# Patient Record
Sex: FEMALE | Race: WHITE | URBAN - METROPOLITAN AREA
[De-identification: names, ages, dates, MRNs, and addresses within clinical notes are randomized per-mention and may not be internally consistent; named-entity substitution may affect disease eponyms.]

---

## 2018-06-11 ENCOUNTER — OFFICE VISIT (OUTPATIENT)
Dept: DERMATOLOGY | Facility: CLINIC | Age: 63
End: 2018-06-11
Payer: COMMERCIAL

## 2018-06-11 ENCOUNTER — TELEPHONE (OUTPATIENT)
Dept: DERMATOLOGY | Facility: CLINIC | Age: 63
End: 2018-06-11

## 2018-06-11 VITALS — OXYGEN SATURATION: 99 % | HEART RATE: 72 BPM

## 2018-06-11 DIAGNOSIS — L82.1 SEBORRHEIC KERATOSIS: ICD-10-CM

## 2018-06-11 DIAGNOSIS — L81.4 LENTIGO: ICD-10-CM

## 2018-06-11 DIAGNOSIS — L82.0 INFLAMED SEBORRHEIC KERATOSIS: ICD-10-CM

## 2018-06-11 DIAGNOSIS — D18.00 ANGIOMA: ICD-10-CM

## 2018-06-11 DIAGNOSIS — C44.612: ICD-10-CM

## 2018-06-11 DIAGNOSIS — D18.00 ANGIOMA: Primary | ICD-10-CM

## 2018-06-11 DIAGNOSIS — D48.5 NEOPLASM OF UNCERTAIN BEHAVIOR OF SKIN: Primary | ICD-10-CM

## 2018-06-11 DIAGNOSIS — D22.9 NEVUS: ICD-10-CM

## 2018-06-11 PROCEDURE — 11101 ZZHC BIOPSY SKIN/SUBQ/MUC MEM, EACH ADDTL LESION: CPT | Performed by: PHYSICIAN ASSISTANT

## 2018-06-11 PROCEDURE — 11100 HC BIOPSY SKIN/SUBQ/MUC MEM, SINGLE LESION: CPT | Mod: 59 | Performed by: PHYSICIAN ASSISTANT

## 2018-06-11 PROCEDURE — 88331 PATH CONSLTJ SURG 1 BLK 1SPC: CPT | Performed by: DERMATOLOGY

## 2018-06-11 PROCEDURE — 17110 DESTRUCTION B9 LES UP TO 14: CPT | Mod: 51 | Performed by: PHYSICIAN ASSISTANT

## 2018-06-11 PROCEDURE — 99203 OFFICE O/P NEW LOW 30 MIN: CPT | Mod: 25 | Performed by: PHYSICIAN ASSISTANT

## 2018-06-11 RX ORDER — SIMVASTATIN 20 MG
40 TABLET ORAL
COMMUNITY
Start: 2018-05-02

## 2018-06-11 RX ORDER — SENNOSIDES A AND B 8.6 MG/1
8.6 TABLET, FILM COATED ORAL
COMMUNITY
Start: 2017-07-12

## 2018-06-11 RX ORDER — INSULIN PUMP SYRINGE, 3 ML
EACH MISCELLANEOUS
COMMUNITY
Start: 2017-05-03

## 2018-06-11 RX ORDER — VALACYCLOVIR HYDROCHLORIDE 500 MG/1
500 TABLET, FILM COATED ORAL
COMMUNITY
Start: 2017-07-12

## 2018-06-11 RX ORDER — BLOOD SUGAR DIAGNOSTIC
STRIP MISCELLANEOUS
COMMUNITY
Start: 2018-05-08

## 2018-06-11 NOTE — MR AVS SNAPSHOT
After Visit Summary   6/11/2018    Anna Marie Lindquist    MRN: [id number]           Patient Information     Date Of Birth          1955        Visit Information        Provider Department      6/11/2018 12:45 PM Mariah Melo PA-C [facility]        Care Instructions          Wound Care Instructions     FOR SUPERFICIAL WOUNDS     [facility] [phone]    [facility] [phone]                       AFTER 24 HOURS YOU SHOULD REMOVE THE BANDAGE AND BEGIN DAILY DRESSING CHANGES AS FOLLOWS:     1) Remove Dressing.     2) Clean and dry the area with tap water using a Q-tip or sterile gauze pad.     3) Apply Vaseline, Aquaphor, Polysporin ointment or Bacitracin ointment over entire wound.  Do NOT use Neosporin ointment.     4) Cover the wound with a band-aid, or a sterile non-stick gauze pad and micropore paper tape      REPEAT THESE INSTRUCTIONS AT LEAST ONCE A DAY UNTIL THE WOUND HAS COMPLETELY HEALED.    It is an old wives tale that a wound heals better when it is exposed to air and allowed to dry out. The wound will heal faster with a better cosmetic result if it is kept moist with ointment and covered with a bandage.    **Do not let the wound dry out.**      Supplies Needed:      *Cotton tipped applicators (Q-tips)    *Polysporin Ointment or Bacitracin Ointment (NOT NEOSPORIN)    *Band-aids or non-stick gauze pads and micropore paper tape.      PATIENT INFORMATION:    During the healing process you will notice a number of changes. All wounds develop a small halo of redness surrounding the wound.  This means healing is occurring. Severe itching with extensive redness usually indicates sensitivity to the ointment or bandage tape used to dress the wound.  You should call our office if this develops.      Swelling  and/or discoloration around your surgical site is common, particularly when performed around the eye.    All wounds normally drain.  The larger  the wound the more drainage there will be.  After 7-10 days, you will notice the wound beginning to shrink and new skin will begin to grow.  The wound is healed when you can see skin has formed over the entire area.  A healed wound has a healthy, shiny look to the surface and is red to dark pink in color to normalize.  Wounds may take approximately 4-6 weeks to heal.  Larger wounds may take 6-8 weeks.  After the wound is healed you may discontinue dressing changes.    You may experience a sensation of tightness as your wound heals. This is normal and will gradually subside.    Your healed wound may be sensitive to temperature changes. This sensitivity improves with time, but if you re having a lot of discomfort, try to avoid temperature extremes.    Patients frequently experience itching after their wound appears to have healed because of the continue healing under the skin.  Plain Vaseline will help relieve the itching.        POSSIBLE COMPLICATIONS    BLEEDIN. Leave the bandage in place.  2. Use tightly rolled up gauze or a cloth to apply direct pressure over the bandage for 30  minutes.  3. Reapply pressure for an additional 30 minutes if necessary  4. Use additional gauze and tape to maintain pressure once the bleeding has stopped.    Proper skin care from Dr. Vegas- [facility]     Eliminate harsh soaps, i.e. Dial, Zest, [place] Spring;   Use mild soaps such as Cetaphil or Dove Sensitive Skin   Avoid hot or cold showers   After showering, lightly dry off.    Aggressive use of a moisturizer (including Vanicream, Cetaphil, Aquaphor or Cerave)   We recommend using a tub that needs to be scooped out, not a pump. This has more of an oil base. It will hold moisture in your skin much better than a water base moisturizer. The ones recommended are non- pore clogging.       If you have any questions call [phone] and follow the prompts to Dr. Vegas's office.             Follow-ups after your visit         Who to contact     If you have questions or need follow up information about today's clinic visit or your schedule please contact [facility] directly at [phone].  Normal or non-critical lab and imaging results will be communicated to you by MyChart, letter or phone within 4 business days after the clinic has received the results. If you do not hear from us within 7 days, please contact the clinic through MyChart or phone. If you have a critical or abnormal lab result, we will notify you by phone as soon as possible.  Submit refill requests through [employer] or call your pharmacy and they will forward the refill request to us. Please allow 3 business days for your refill to be completed.          Additional Information About Your Visit        Care EveryWhere ID     This is your Care EveryWhere ID. This could be used by other organizations to access your [place] medical records  [id number]        Your Vitals Were     Pulse Last Period Pulse Oximetry             72 08/21/2002 99%          Blood Pressure from Last 3 Encounters:   09/27/06 110/70   09/13/06 121/75   04/28/05 104/64    Weight from Last 3 Encounters:   09/27/06 84.4 kg (186 lb)   09/13/06 81.9 kg (180 lb 8 oz)   04/28/05 86.2 kg (190 lb)              Today, you had the following     No orders found for display       Primary Care Provider Office Phone # Fax #    Olamide SHERMAN MD [facility] [phone]       [address]  [facility] [address]        Equal Access to Services     NADINE SAMAYOA : Hadii heike haines hadasho Sojo[place]inali, waaxda luqadaha, qaybta kaalmada yunior, eduardo patel . So [facility] [phone].    ATENCIÓN: Si abdiasla roslyn, tiene a ayala disposición servicios gratuitos de asistencia lingüística. Larry al [phone].    We comply with applicable federal civil rights laws and [place] laws. We do not discriminate on the basis of race, color, national origin, age, disability, sex,  sexual orientation, or gender identity.            Thank you!     Thank you for choosing [facility]  for your care. Our goal is always to provide you with excellent care. Hearing back from our patients is one way we can continue to improve our services. Please take a few minutes to complete the written survey that you may receive in the mail after your visit with us. Thank you!             Your Updated Medication List - Protect others around you: Learn how to safely use, store and throw away your medicines at www.disposemymeds.org.          This list is accurate as of 6/11/18  1:19 PM.  Always use your most recent med list.                   Brand Name Dispense Instructions for use Diagnosis    ACCU-CHEK GUIDE test strip   Generic drug:  blood glucose monitoring           FIFTY50 GLUCOSE METER 2.0 w/Device Kit      Dispense glucose meter, test strips and lancets covered by the patient insurance. Test 1 time per day.        metFORMIN 500 MG tablet    GLUCOPHAGE     Take 500 mg by mouth        ONE-A-DAY WOMENS 50+ ADVANTAGE Tabs           senna 8.6 MG tablet    SENOKOT     Take 8.6 mg by mouth        sertraline 50 MG tablet    ZOLOFT     Take 50 mg by mouth        simvastatin 20 MG tablet    ZOCOR     Take 40 mg by mouth        valACYclovir 500 MG tablet    VALTREX     Take 500 mg by mouth        vitamin D3 1000 units Caps      Take 2,000 Units by mouth

## 2018-06-11 NOTE — NURSING NOTE
"Initial Pulse 72  LMP 08/21/2002  SpO2 99% Estimated body mass index is 28.7 kg/(m^2) as calculated from the following:    Height as of 9/13/06: 1.715 m (5' 7.5\").    Weight as of 9/27/06: 84.4 kg (186 lb). .      "

## 2018-06-11 NOTE — TELEPHONE ENCOUNTER
----- Message from Jhonny Vegas MD sent at 6/11/2018  1:41 PM CDT -----  L forearm angioma no treatment   R deltoid basal cell carcinoma schedule

## 2018-06-11 NOTE — PATIENT INSTRUCTIONS
Wound Care Instructions     FOR SUPERFICIAL WOUNDS     [facility] [phone]    [facility] [phone]                       AFTER 24 HOURS YOU SHOULD REMOVE THE BANDAGE AND BEGIN DAILY DRESSING CHANGES AS FOLLOWS:     1) Remove Dressing.     2) Clean and dry the area with tap water using a Q-tip or sterile gauze pad.     3) Apply Vaseline, Aquaphor, Polysporin ointment or Bacitracin ointment over entire wound.  Do NOT use Neosporin ointment.     4) Cover the wound with a band-aid, or a sterile non-stick gauze pad and micropore paper tape      REPEAT THESE INSTRUCTIONS AT LEAST ONCE A DAY UNTIL THE WOUND HAS COMPLETELY HEALED.    It is an old wives tale that a wound heals better when it is exposed to air and allowed to dry out. The wound will heal faster with a better cosmetic result if it is kept moist with ointment and covered with a bandage.    **Do not let the wound dry out.**      Supplies Needed:      *Cotton tipped applicators (Q-tips)    *Polysporin Ointment or Bacitracin Ointment (NOT NEOSPORIN)    *Band-aids or non-stick gauze pads and micropore paper tape.      PATIENT INFORMATION:    During the healing process you will notice a number of changes. All wounds develop a small halo of redness surrounding the wound.  This means healing is occurring. Severe itching with extensive redness usually indicates sensitivity to the ointment or bandage tape used to dress the wound.  You should call our office if this develops.      Swelling  and/or discoloration around your surgical site is common, particularly when performed around the eye.    All wounds normally drain.  The larger the wound the more drainage there will be.  After 7-10 days, you will notice the wound beginning to shrink and new skin will begin to grow.  The wound is healed when you can see skin has formed over the entire area.  A healed wound has a healthy, shiny look to the surface and is red to dark pink in color  to normalize.  Wounds may take approximately 4-6 weeks to heal.  Larger wounds may take 6-8 weeks.  After the wound is healed you may discontinue dressing changes.    You may experience a sensation of tightness as your wound heals. This is normal and will gradually subside.    Your healed wound may be sensitive to temperature changes. This sensitivity improves with time, but if you re having a lot of discomfort, try to avoid temperature extremes.    Patients frequently experience itching after their wound appears to have healed because of the continue healing under the skin.  Plain Vaseline will help relieve the itching.        POSSIBLE COMPLICATIONS    BLEEDIN. Leave the bandage in place.  2. Use tightly rolled up gauze or a cloth to apply direct pressure over the bandage for 30  minutes.  3. Reapply pressure for an additional 30 minutes if necessary  4. Use additional gauze and tape to maintain pressure once the bleeding has stopped.    Proper skin care from Dr. Vegas- [facility]     Eliminate harsh soaps, i.e. Dial, Zest, [place];   Use mild soaps such as Cetaphil or Dove Sensitive Skin   Avoid hot or cold showers   After showering, lightly dry off.    Aggressive use of a moisturizer (including Vanicream, Cetaphil, Aquaphor or Cerave)   We recommend using a tub that needs to be scooped out, not a pump. This has more of an oil base. It will hold moisture in your skin much better than a water base moisturizer. The ones recommended are non- pore clogging.       If you have any questions call [phone] and follow the prompts to Dr. Vegas's office.

## 2018-06-11 NOTE — PROGRESS NOTES
HPI:   Anna Marie Lindquist is a 62 year old female who presents for Full skin cancer screening.  chief complaint  Last Skin Exam: years ago      1st Baseline: no  Personal HX of Skin Cancer: no   Personal HX of Malignant Melanoma: no   Family HX of Skin Cancer / Malignant Melanoma: no  Personal HX of Atypical Moles:   no  Risk factors: sun exposure; history of blistering sunburns  New / Changing lesions:yes few scaly areas  Social History: Lives in [place], [place].   On review of systems, there are no further skin complaints, patient is feeling otherwise well.  See patient intake sheet.  ROS of the following were done and are negative: Constitutional, Eyes, Ears, Nose,   Mouth, Throat, Cardiovascular, Respiratory, GI, Genitourinary, Musculoskeletal,   Psychiatric, Endocrine, Allergic/Immunologic.    PHYSICAL EXAM:   Pulse 72  LMP 08/21/2002  SpO2 99%  Skin exam performed as follows: Type 2 skin. Mood appropriate  Alert and Oriented X 3. Well developed, well nourished in no distress.  General appearance: Normal  Head including face: Normal  Eyes: conjunctiva and lids: Normal  Mouth: Lips, teeth, gums: Normal  Neck: Normal  Chest-breast/axillae: Normal  Back: Normal  Spleen and liver: Normal  Cardiovascular: Exam of peripheral vascular system by observation for swelling, varicosities, edema: Normal  Genitalia: groin, buttocks: Normal  Extremities: digits/nails (clubbing): Normal  Eccrine and Apocrine glands: Normal  Right upper extremity: Normal  Left upper extremity: Normal  Right lower extremity: Normal  Left lower extremity: Normal  Skin: Scalp and body hair: See below    Pt deferred exam of breasts, groin, buttocks: No    Other physical findings:  1. Multiple pigmented macules on extremities and trunk  2. Multiple pigmented macules on face, trunk and extremities  3. Multiple vascular papules on trunk, arms and legs  4. Multiple scattered keratotic plaques  5. Inflamed keratotic papule on right chest x 1, right knee x 1,  left knee x 1, back x 2  6. Pink papule 7 mm on the right deltoid  7. 5 mm vascular papule on the left forearm       Except as noted above, no other signs of skin cancer or melanoma.     ASSESSMENT/PLAN:   Benign Full skin cancer screening today. . Patient with history of none  Advised on monthly self exams and 1 year  Patient Education: Appropriate brochures given.    Multiple benign appearing nevi on arms, legs and trunk. Discussed ABCDEs of melanoma and sunscreen.   Multiple lentigos on arms, legs and trunk. Advised benign, no treatment needed.  Multiple scattered angiomas. Advised benign, no treatment needed.   Seborrheic keratosis on arms, legs and trunk. Advised benign, no treatment needed.  Inflamed seborrheic keratosis on right chest x 1, right knee x 1, left knee x 1, back x 2. As physically tender cryosurgery performed. Advised on post op care.   R/o BCC on the right deltoid. Shave bx in typical fashion .  Area cleaned with betadyne and anesthetized with 1% lidocaine with epi .  Dermablade used to remove the lesion and sent to pathology. Bleeding was cauterized. Pt tolerated procedure well.  Angioma r/o other on the left forearm. Shave bx in typical fashion .  Area cleaned with betadyne and anesthetized with 1% lidocaine with epi .  Dermablade used to remove the lesion and sent to pathology. Bleeding was cauterized. Pt tolerated procedure well.          Follow-up: yearly FSE/PRN sooner    1.) Patient was asked about new and changing moles. YES  2.) Patient received a complete physical skin examination: YES  3.) Patient was counseled to perform a monthly self skin examination: YES  Scribed By: Mariah Melo, MS, PA-C

## 2018-06-11 NOTE — MR AVS SNAPSHOT
After Visit Summary   6/11/2018    Anna Marie Lindquist    MRN: [id number]           Patient Information     Date Of Birth          1955        Visit Information        Provider Department      6/11/2018 1:00 PM Jhonny Vegas MD [facility]        Today's Diagnoses     Angioma    -  1    Basal cell carcinoma of right deltoid region           Follow-ups after your visit        Who to contact     If you have questions or need follow up information about today's clinic visit or your schedule please contact [facility] directly at [phone].  Normal or non-critical lab and imaging results will be communicated to you by MyChart, letter or phone within 4 business days after the clinic has received the results. If you do not hear from us within 7 days, please contact the clinic through MyChart or phone. If you have a critical or abnormal lab result, we will notify you by phone as soon as possible.  Submit refill requests through [employer] or call your pharmacy and they will forward the refill request to us. Please allow 3 business days for your refill to be completed.          Additional Information About Your Visit        Care EveryWhere ID     This is your Care EveryWhere ID. This could be used by other organizations to access your [place] medical records  [id number]        Your Vitals Were     Last Period                   08/21/2002            Blood Pressure from Last 3 Encounters:   09/27/06 110/70   09/13/06 121/75   04/28/05 104/64    Weight from Last 3 Encounters:   09/27/06 84.4 kg (186 lb)   09/13/06 81.9 kg (180 lb 8 oz)   04/28/05 86.2 kg (190 lb)              We Performed the Following     CL FROZEN SECTION FIRST SPEC        Primary Care Provider Office Phone # Fax #    Olamide Street [phone]       [address]    [facility] [address]        Equal Access to Services     JEREMY SAMAYOA : marc Foy  guillermina chavarrialauro macrinaeduardo rosales. So [facility] [phone].    ATENCIÓN: Si cristobal mcgowna, tiene a ayala disposición servicios gratuitos de asistencia lingüística. Larry al [phone].    We comply with applicable federal civil rights laws and [place] laws. We do not discriminate on the basis of race, color, national origin, age, disability, sex, sexual orientation, or gender identity.            Thank you!     Thank you for choosing [facility]  for your care. Our goal is always to provide you with excellent care. Hearing back from our patients is one way we can continue to improve our services. Please take a few minutes to complete the written survey that you may receive in the mail after your visit with us. Thank you!             Your Updated Medication List - Protect others around you: Learn how to safely use, store and throw away your medicines at www.disposemymeds.org.          This list is accurate as of 6/11/18  1:42 PM.  Always use your most recent med list.                   Brand Name Dispense Instructions for use Diagnosis    ACCU-CHEK GUIDE test strip   Generic drug:  blood glucose monitoring           FIFTY50 GLUCOSE METER 2.0 w/Device Kit      Dispense glucose meter, test strips and lancets covered by the patient insurance. Test 1 time per day.        metFORMIN 500 MG tablet    GLUCOPHAGE     Take 500 mg by mouth        ONE-A-DAY WOMENS 50+ ADVANTAGE Tabs           senna 8.6 MG tablet    SENOKOT     Take 8.6 mg by mouth        sertraline 50 MG tablet    ZOLOFT     Take 50 mg by mouth        simvastatin 20 MG tablet    ZOCOR     Take 40 mg by mouth        valACYclovir 500 MG tablet    VALTREX     Take 500 mg by mouth        vitamin D3 1000 units Caps      Take 2,000 Units by mouth

## 2018-06-11 NOTE — TELEPHONE ENCOUNTER
Call placed to patient.  Left message on answering machine for patient to call back.  Graciela Luna RN  [facility]

## 2018-06-11 NOTE — LETTER
[facility]  [address] [address]  Phone: [phone]       June 11, 2018       Anna Marie Lindquist  [address] [place] [address] [address]  [facility] [address]          Dear Anna Marie:    You are scheduled for Mohs Surgery on July 11th at 7:45am    Please check in at Dermatology Clinic.   (2nd Floor, last  Clinic on right up staircase or elevator -past OB/GYN clinic)    You don't need to arrive more than 5-10 minutes prior to your appointment time.     Be sure to eat a good breakfast and bathe and wash your hair prior to Surgery.    If you are taking any anti-coagulants that are prescribed by your Doctor (such as Coumadin/warfarin, Plavix, Aspirin, Ibuprofen), please continue taking them.     However, If you are taking anti-coagulants over the counter without  a Doctor's order for a Medical condition, please discontinue them 10 days prior to Surgery.      Please wear loose comfortable clothing as it could possibly be 4-6 hours until your surgery is completed depending upon how many layers of tissue need to be removed.     Wi-fi access is available.       Thank you,  Dr. Vegas/Louis BOND

## 2018-06-11 NOTE — LETTER
6/11/2018         RE: Anna Marie Lindquist  [address]  [place] [address]        Dear Colleague,    Thank you for referring your patient, Anna Marie Lindquist, to the [facility]. Please see a copy of my visit note below.    L forearm : benign angioma in dermis, unremarkable dermis and epidermis   R deltoid:Orthokeratosis of epidermis with a proliferation of nests of basaloid cells, with peripheral palisading and a haphazard arrangement in the center extending into the dermis, forming nodules.  The tumor cells have hyperchromatic nuclei. Poor cytoplasm and intercellular bridging.        L forearm angioma no treatment   R deltoid basal cell carcinoma schedule       Again, thank you for allowing me to participate in the care of your patient.        Sincerely,        Jhonny Vegas MD

## 2018-06-11 NOTE — LETTER
6/11/2018         RE: Anna Marie Lindquist  [address]  [place] [address]        Dear Colleague,    Thank you for referring your patient, Anna Marie Lindquist, to the [facility]. Please see a copy of my visit note below.    HPI:   Anna Marie Lindquist is a 62 year old female who presents for Full skin cancer screening.  chief complaint  Last Skin Exam: years ago      1st Baseline: no  Personal HX of Skin Cancer: no   Personal HX of Malignant Melanoma: no   Family HX of Skin Cancer / Malignant Melanoma: no  Personal HX of Atypical Moles:   no  Risk factors: sun exposure; history of blistering sunburns  New / Changing lesions:yes few scaly areas  Social History: Lives in [place], [place].   On review of systems, there are no further skin complaints, patient is feeling otherwise well.  See patient intake sheet.  ROS of the following were done and are negative: Constitutional, Eyes, Ears, Nose,   Mouth, Throat, Cardiovascular, Respiratory, GI, Genitourinary, Musculoskeletal,   Psychiatric, Endocrine, Allergic/Immunologic.    PHYSICAL EXAM:   Pulse 72  LMP 08/21/2002  SpO2 99%  Skin exam performed as follows: Type 2 skin. Mood appropriate  Alert and Oriented X 3. Well developed, well nourished in no distress.  General appearance: Normal  Head including face: Normal  Eyes: conjunctiva and lids: Normal  Mouth: Lips, teeth, gums: Normal  Neck: Normal  Chest-breast/axillae: Normal  Back: Normal  Spleen and liver: Normal  Cardiovascular: Exam of peripheral vascular system by observation for swelling, varicosities, edema: Normal  Genitalia: groin, buttocks: Normal  Extremities: digits/nails (clubbing): Normal  Eccrine and Apocrine glands: Normal  Right upper extremity: Normal  Left upper extremity: Normal  Right lower extremity: Normal  Left lower extremity: Normal  Skin: Scalp and body hair: See below    Pt deferred exam of breasts, groin, buttocks: No    Other physical findings:  1. Multiple pigmented macules on  extremities and trunk  2. Multiple pigmented macules on face, trunk and extremities  3. Multiple vascular papules on trunk, arms and legs  4. Multiple scattered keratotic plaques  5. Inflamed keratotic papule on right chest x 1, right knee x 1, left knee x 1, back x 2  6. Pink papule 7 mm on the right deltoid  7. 5 mm vascular papule on the left forearm       Except as noted above, no other signs of skin cancer or melanoma.     ASSESSMENT/PLAN:   Benign Full skin cancer screening today. . Patient with history of none  Advised on monthly self exams and 1 year  Patient Education: Appropriate brochures given.    Multiple benign appearing nevi on arms, legs and trunk. Discussed ABCDEs of melanoma and sunscreen.   Multiple lentigos on arms, legs and trunk. Advised benign, no treatment needed.  Multiple scattered angiomas. Advised benign, no treatment needed.   Seborrheic keratosis on arms, legs and trunk. Advised benign, no treatment needed.  Inflamed seborrheic keratosis on right chest x 1, right knee x 1, left knee x 1, back x 2. As physically tender cryosurgery performed. Advised on post op care.   R/o BCC on the right deltoid. Shave bx in typical fashion .  Area cleaned with betadyne and anesthetized with 1% lidocaine with epi .  Dermablade used to remove the lesion and sent to pathology. Bleeding was cauterized. Pt tolerated procedure well.  Angioma r/o other on the left forearm. Shave bx in typical fashion .  Area cleaned with betadyne and anesthetized with 1% lidocaine with epi .  Dermablade used to remove the lesion and sent to pathology. Bleeding was cauterized. Pt tolerated procedure well.          Follow-up: yearly FSE/PRN sooner    1.) Patient was asked about new and changing moles. YES  2.) Patient received a complete physical skin examination: YES  3.) Patient was counseled to perform a monthly self skin examination: YES  Scribed By: Mariah Melo, MS, PA-C      Again, thank you for allowing me to  participate in the care of your patient.        Sincerely,        Mariah Purdy PA-C

## 2018-06-11 NOTE — TELEPHONE ENCOUNTER
Patient returned call. MOHS scheduled. Letter and information sent    Татьяна CHAIDEZ RN   [facility]

## 2018-06-11 NOTE — PROGRESS NOTES
L forearm : benign angioma in dermis, unremarkable dermis and epidermis   R deltoid:Orthokeratosis of epidermis with a proliferation of nests of basaloid cells, with peripheral palisading and a haphazard arrangement in the center extending into the dermis, forming nodules.  The tumor cells have hyperchromatic nuclei. Poor cytoplasm and intercellular bridging.        L forearm angioma no treatment   R deltoid basal cell carcinoma schedule

## 2018-07-11 ENCOUNTER — OFFICE VISIT (OUTPATIENT)
Dept: DERMATOLOGY | Facility: CLINIC | Age: 63
End: 2018-07-11
Payer: COMMERCIAL

## 2018-07-11 VITALS — OXYGEN SATURATION: 100 % | SYSTOLIC BLOOD PRESSURE: 101 MMHG | HEART RATE: 60 BPM | DIASTOLIC BLOOD PRESSURE: 62 MMHG

## 2018-07-11 DIAGNOSIS — C44.612 BASAL CELL CARCINOMA OF RIGHT UPPER EXTREMITY: Primary | ICD-10-CM

## 2018-07-11 PROCEDURE — 88331 PATH CONSLTJ SURG 1 BLK 1SPC: CPT | Performed by: DERMATOLOGY

## 2018-07-11 PROCEDURE — 11602 EXC TR-EXT MAL+MARG 1.1-2 CM: CPT | Performed by: DERMATOLOGY

## 2018-07-11 NOTE — NURSING NOTE
"Initial /62  Pulse 60  LMP 08/21/2002  SpO2 100% Estimated body mass index is 28.7 kg/(m^2) as calculated from the following:    Height as of 9/13/06: 1.715 m (5' 7.5\").    Weight as of 9/27/06: 84.4 kg (186 lb). .    Ami Shahid LPN    "

## 2018-07-11 NOTE — PROGRESS NOTES
Anna Marie Lindquist is a 62 year old year old female patient here today for evaluation and managment of basal cell carcinoma on right deltoid.  Patient reports the following modifying factors none.  Associated symptoms: none.  Patient has no other skin complaints today.  Remainder of the HPI, Meds, PMH, Allergies, FH, and SH was reviewed in chart.      Past Medical History:   Diagnosis Date     Basal cell carcinoma      Rosacea        Past Surgical History:   Procedure Laterality Date     NO HISTORY OF SURGERY          Family History   Problem Relation Age of Onset     HEART DISEASE Father      heart attack     Lipids Father      Cancer Brother      Hodgekin's and intestinal cancer (?)     Melanoma No family hx of      Skin Cancer No family hx of        Social History     Social History     Marital status: Single     Spouse name: N/A     Number of children: 0     Years of education: N/A     Occupational History      [employer]     Social History Main Topics     Smoking status: Former Smoker     Packs/day: 2.00     Years: 15.00     Types: Cigarettes     Quit date: 1/1/1985     Smokeless tobacco: Never Used     Alcohol use No     Drug use: No     Sexual activity: Not Currently     Partners: Male     Other Topics Concern      Service No     Blood Transfusions No     Caffeine Concern Yes     4 cups of coffee QD     Occupational Exposure No     Hobby Hazards No     Sleep Concern No     Stress Concern Yes     changes in life     Weight Concern No     Exercise No     Seat Belt Yes     Self-Exams No     occ     Social History Narrative       Outpatient Encounter Prescriptions as of 7/11/2018   Medication Sig Dispense Refill     ACCU-CHEK GUIDE test strip        Blood Glucose Monitoring Suppl (FIFTY50 GLUCOSE METER 2.0) w/Device KIT Dispense glucose meter, test strips and lancets covered by the patient insurance. Test 1 time per day.       Cholecalciferol (VITAMIN D3) 1000 units CAPS  Take 2,000 Units by mouth       metFORMIN (GLUCOPHAGE) 500 MG tablet Take 500 mg by mouth       Multiple Vitamins-Minerals (ONE-A-DAY WOMENS 50+ ADVANTAGE) TABS        senna (SENOKOT) 8.6 MG tablet Take 8.6 mg by mouth       sertraline (ZOLOFT) 50 MG tablet Take 50 mg by mouth       simvastatin (ZOCOR) 20 MG tablet Take 40 mg by mouth       valACYclovir (VALTREX) 500 MG tablet Take 500 mg by mouth       No facility-administered encounter medications on file as of 7/11/2018.              Review Of Systems  Skin: As above  Eyes: negative  Ears/Nose/Throat: negative  Respiratory: No shortness of breath, dyspnea on exertion, cough, or hemoptysis  Cardiovascular: negative  Gastrointestinal: negative  Genitourinary: negative  Musculoskeletal: negative  Neurologic: negative  Psychiatric: negative  Hematologic/Lymphatic/Immunologic: negative  Endocrine: negative      O:   NAD, WDWN, Alert & Oriented, Mood & Affect wnl, Vitals stable   Here today alone   /62  Pulse 60  LMP 08/21/2002  SpO2 100%   General appearance normal   Vitals stable   Alert, oriented and in no acute distress     R deltoid 7mm pink pearly papule       Eyes: Conjunctivae/lids:Normal     ENT: Lips, buccal mucosa, tongue: normal    MSK:Normal    Cardiovascular: peripheral edema none    Pulm: Breathing Normal    Neuro/Psych: Orientation:Normal; Mood/Affect:Normal      MICRO:   R deltoid:Unremarkable epidermis with parallel bundles of cellular collagen within the superficial dermis.  No concerning areas for malignancy.     A/P:  1. R deltoid basal cell carcinoma   EXCISION OF BASAL CELL CARCINOMA, Margins confirmed with FROZEN SECTIONS AND Second intent: After thorough discussion of PGACAC, consent obtained, anesthesia and prep, the margins of the lesion were identified and an elliptical incision was made encompassing the lesion with 4mm margin. The incisions were made through the skin and down to and including the superficial dermis.  The lesion was  removed en bloc and submitted for frozen section pathologic review. Clear margins obtained (1.3cm).    REPAIR SECOND INTENT: We discussed the options for wound management in full with the patient including risks/benefits/possible outcomes. Decision made to allow the wound to heal by second intention. EBL minimal; complications none; wound care routine.  The patient was discharged in good condition and will return in one month or prn for wound evaluation.       BENIGN LESIONS DISCUSSED WITH PATIENT:  I discussed the specifics of tumor, prognosis, and genetics of benign lesions.  I explained that treatment of these lesions would be purely cosmetic and not medically neccessary.  I discussed with patient different removal options including excision, cautery and /or laser.      Nature and genetics of benign skin lesions dicussed with patient.  Signs and Symptoms of skin cancer discussed with patient.  Patient encouraged to perform monthly skin exams.  UV precautions reviewed with patient.  Patient to follow up with Primary Care provider regarding elevated blood pressure.  Skin care regimen reviewed with patient: Eliminate harsh soaps, i.e. Dial, zest, irsih spring; Mild soaps such as Cetaphil or Dove sensitive skin, avoid hot or cold showers, aggressive use of emollients including vanicream, cetaphil or cerave discussed with patient.    Risks of non-melanoma skin cancer discussed with patient   Return to clinic 6 months  Patient to follow up with Primary Care provider regarding elevated blood pressure.

## 2018-07-11 NOTE — MR AVS SNAPSHOT
After Visit Summary   2018    Anna Marie Lindquist    MRN: [id number]           Patient Information     Date Of Birth          1955        Visit Information        Provider Department      2018 7:45 AM Jhonny Vegas MD [facility]        Today's Diagnoses     Basal cell carcinoma of right upper extremity    -  1      Care Instructions    Open Wound Care     for Right arm      ? No strenuous activity for 48 hours    ? Take Tylenol as needed for discomfort.                                                .         ? Do not drink alcoholic beverages for 48 hours.    ? Keep the pressure bandage in place for 24 hours. If the bandage becomes blood tinged or loose, reinforce it with gauze and tape.        (Refer to the reverse side of this page for management of bleeding).    ? Remove bandage in 24 hours and begin wound care as follows:     1. Clean area with tap water using a Q tip or gauze pad, (shower / bathe normally)  2. Dry wound with Q tip or gauze pad  3. Apply Aquaphor, Vaseline, Polysporin or Bacitracin Ointment with a Q tip    Do NOT use Neosporin Ointment *  4. Cover the wound with a band-aid or nonstick gauze pad and paper tape.  5. Repeat wound care once a day until wound is completely healed.    It is an old wives tale that a wound heals better when it is exposed to air and allowed to dry out. The wound will heal faster with a better cosmetic result if it is kept moist with ointment and covered with a bandage.  Do not let the wound dry out.      Supplies Needed:                Qtips or gauze pads                Polysporin or Bacitracin Ointment                Bandaids or nonstick gauze pads and paper tape    Wound care kits and brown paper tape are available for purchase at   the pharmacy.    BLEEDIN. Use tightly rolled up gauze or cloth to apply direct pressure over the bandage for 20   minutes.  2. Reapply pressure for an additional 20 minutes if  necessary  3. Call the office or go to the nearest emergency room if pressure fails to stop the bleeding.  4. Use additional gauze and tape to maintain pressure once the bleeding has stopped.  5. Begin wound care 24 hours after surgery as directed.                  WOUND HEALING    1. One week after surgery a pink / red halo will form around the outside of the wound.   This is new skin.  2. The center of the wound will appear yellowish white and produce some drainage.  3. The pink halo will slowly migrate in toward the center of the wound until the wound is covered with new shiny pink skin.  4. There will be no more drainage when the wound is completely healed.  5. It will take six months to one year for the redness to fade.  6. The scar may be itchy, tight and sensitive to extreme temperatures for a year after the surgery.  7. Massaging the area several times a day for several minutes after the wound is completely healed will help the scar soften and normalize faster. Begin massage only after healing is complete.      In case of emergency call: Dr Vegas: [phone]     [facility]: [phone]    [facility]: [phone]              Follow-ups after your visit        Who to contact     If you have questions or need follow up information about today's clinic visit or your schedule please contact [facility] directly at [phone].  Normal or non-critical lab and imaging results will be communicated to you by MyChart, letter or phone within 4 business days after the clinic has received the results. If you do not hear from us within 7 days, please contact the clinic through MyChart or phone. If you have a critical or abnormal lab result, we will notify you by phone as soon as possible.  Submit refill requests through [employer] or call your pharmacy and they will forward the refill request to us. Please allow 3 business days for your refill to be completed.          Additional  Information About Your Visit        Care EveryWhere ID     This is your Care EveryWhere ID. This could be used by other organizations to access your [place] medical records  [id number]        Your Vitals Were     Pulse Last Period Pulse Oximetry             60 08/21/2002 100%          Blood Pressure from Last 3 Encounters:   07/11/18 101/62   09/27/06 110/70   09/13/06 121/75    Weight from Last 3 Encounters:   09/27/06 84.4 kg (186 lb)   09/13/06 81.9 kg (180 lb 8 oz)   04/28/05 86.2 kg (190 lb)              We Performed the Following     53016 - EXC MALIG SKIN LESION TRUNK/ARM/LEG 1.1-2.0 CM     CL FROZEN SECTION FIRST SPEC        Primary Care Provider Office Phone # Fax #    Olamide SHERMAN MD [place] [phone]       [address]  [place] [address]        Equal Access to Services     JEREMY SAMAYOA : Hadii aad ku hadasho Soomaali, waaxda luqadaha, qaybta kaalmada adeegyada, waxay leenain hayraen kemar patel . So [facility] [phone].    ATENCIÓN: Si habla español, tiene a ayala disposición servicios gratuitos de asistencia lingüística. Larry al [phone].    We comply with applicable federal civil rights laws and [place] laws. We do not discriminate on the basis of race, color, national origin, age, disability, sex, sexual orientation, or gender identity.            Thank you!     Thank you for choosing [facility]  for your care. Our goal is always to provide you with excellent care. Hearing back from our patients is one way we can continue to improve our services. Please take a few minutes to complete the written survey that you may receive in the mail after your visit with us. Thank you!             Your Updated Medication List - Protect others around you: Learn how to safely use, store and throw away your medicines at www.disposemymeds.org.          This list is accurate as of 7/11/18  9:18 AM.  Always use your most recent med list.                   Brand Name  Dispense Instructions for use Diagnosis    ACCU-CHEK GUIDE test strip   Generic drug:  blood glucose monitoring           FIFTY50 GLUCOSE METER 2.0 w/Device Kit      Dispense glucose meter, test strips and lancets covered by the patient insurance. Test 1 time per day.        metFORMIN 500 MG tablet    GLUCOPHAGE     Take 500 mg by mouth        ONE-A-DAY WOMENS 50+ ADVANTAGE Tabs           senna 8.6 MG tablet    SENOKOT     Take 8.6 mg by mouth        sertraline 50 MG tablet    ZOLOFT     Take 50 mg by mouth        simvastatin 20 MG tablet    ZOCOR     Take 40 mg by mouth        valACYclovir 500 MG tablet    VALTREX     Take 500 mg by mouth        vitamin D3 1000 units Caps      Take 2,000 Units by mouth

## 2018-07-11 NOTE — PATIENT INSTRUCTIONS
Open Wound Care     for Right arm      ? No strenuous activity for 48 hours    ? Take Tylenol as needed for discomfort.                                                .         ? Do not drink alcoholic beverages for 48 hours.    ? Keep the pressure bandage in place for 24 hours. If the bandage becomes blood tinged or loose, reinforce it with gauze and tape.        (Refer to the reverse side of this page for management of bleeding).    ? Remove bandage in 24 hours and begin wound care as follows:     1. Clean area with tap water using a Q tip or gauze pad, (shower / bathe normally)  2. Dry wound with Q tip or gauze pad  3. Apply Aquaphor, Vaseline, Polysporin or Bacitracin Ointment with a Q tip    Do NOT use Neosporin Ointment *  4. Cover the wound with a band-aid or nonstick gauze pad and paper tape.  5. Repeat wound care once a day until wound is completely healed.    It is an old wives tale that a wound heals better when it is exposed to air and allowed to dry out. The wound will heal faster with a better cosmetic result if it is kept moist with ointment and covered with a bandage.  Do not let the wound dry out.      Supplies Needed:                Qtips or gauze pads                Polysporin or Bacitracin Ointment                Bandaids or nonstick gauze pads and paper tape    Wound care kits and brown paper tape are available for purchase at   the pharmacy.    BLEEDIN. Use tightly rolled up gauze or cloth to apply direct pressure over the bandage for 20   minutes.  2. Reapply pressure for an additional 20 minutes if necessary  3. Call the office or go to the nearest emergency room if pressure fails to stop the bleeding.  4. Use additional gauze and tape to maintain pressure once the bleeding has stopped.  5. Begin wound care 24 hours after surgery as directed.                  WOUND HEALING    1. One week after surgery a pink / red halo will form around the outside of the wound.   This is new  skin.  2. The center of the wound will appear yellowish white and produce some drainage.  3. The pink halo will slowly migrate in toward the center of the wound until the wound is covered with new shiny pink skin.  4. There will be no more drainage when the wound is completely healed.  5. It will take six months to one year for the redness to fade.  6. The scar may be itchy, tight and sensitive to extreme temperatures for a year after the surgery.  7. Massaging the area several times a day for several minutes after the wound is completely healed will help the scar soften and normalize faster. Begin massage only after healing is complete.      In case of emergency call: Dr Vegas: [phone]     [facility]: [phone]    [facility]: [phone]